# Patient Record
Sex: FEMALE
[De-identification: names, ages, dates, MRNs, and addresses within clinical notes are randomized per-mention and may not be internally consistent; named-entity substitution may affect disease eponyms.]

---

## 2022-07-18 ENCOUNTER — HOSPITAL ENCOUNTER (INPATIENT)
Dept: HOSPITAL 5 - TRG | Age: 27
LOS: 1 days | Discharge: HOME | End: 2022-07-19
Attending: STUDENT IN AN ORGANIZED HEALTH CARE EDUCATION/TRAINING PROGRAM | Admitting: STUDENT IN AN ORGANIZED HEALTH CARE EDUCATION/TRAINING PROGRAM
Payer: COMMERCIAL

## 2022-07-18 DIAGNOSIS — Z20.822: ICD-10-CM

## 2022-07-18 DIAGNOSIS — K50.90: ICD-10-CM

## 2022-07-18 DIAGNOSIS — Z3A.40: ICD-10-CM

## 2022-07-18 DIAGNOSIS — Z23: ICD-10-CM

## 2022-07-18 LAB
HCT VFR BLD CALC: 39.7 % (ref 30.3–42.9)
HGB BLD-MCNC: 14.1 GM/DL (ref 10.1–14.3)
MCHC RBC AUTO-ENTMCNC: 35 % (ref 30–34)
MCV RBC AUTO: 85 FL (ref 79–97)
PLATELET # BLD: 186 K/MM3 (ref 140–440)
RBC # BLD AUTO: 4.65 M/MM3 (ref 3.65–5.03)

## 2022-07-18 PROCEDURE — U0003 INFECTIOUS AGENT DETECTION BY NUCLEIC ACID (DNA OR RNA); SEVERE ACUTE RESPIRATORY SYNDROME CORONAVIRUS 2 (SARS-COV-2) (CORONAVIRUS DISEASE [COVID-19]), AMPLIFIED PROBE TECHNIQUE, MAKING USE OF HIGH THROUGHPUT TECHNOLOGIES AS DESCRIBED BY CMS-2020-01-R: HCPCS

## 2022-07-18 PROCEDURE — 86901 BLOOD TYPING SEROLOGIC RH(D): CPT

## 2022-07-18 PROCEDURE — 3E0R3BZ INTRODUCTION OF ANESTHETIC AGENT INTO SPINAL CANAL, PERCUTANEOUS APPROACH: ICD-10-PCS | Performed by: STUDENT IN AN ORGANIZED HEALTH CARE EDUCATION/TRAINING PROGRAM

## 2022-07-18 PROCEDURE — 86900 BLOOD TYPING SEROLOGIC ABO: CPT

## 2022-07-18 PROCEDURE — 85027 COMPLETE CBC AUTOMATED: CPT

## 2022-07-18 PROCEDURE — 86592 SYPHILIS TEST NON-TREP QUAL: CPT

## 2022-07-18 PROCEDURE — 85018 HEMOGLOBIN: CPT

## 2022-07-18 PROCEDURE — 59025 FETAL NON-STRESS TEST: CPT

## 2022-07-18 PROCEDURE — 85014 HEMATOCRIT: CPT

## 2022-07-18 PROCEDURE — 00HU33Z INSERTION OF INFUSION DEVICE INTO SPINAL CANAL, PERCUTANEOUS APPROACH: ICD-10-PCS | Performed by: STUDENT IN AN ORGANIZED HEALTH CARE EDUCATION/TRAINING PROGRAM

## 2022-07-18 PROCEDURE — 36415 COLL VENOUS BLD VENIPUNCTURE: CPT

## 2022-07-18 PROCEDURE — 86850 RBC ANTIBODY SCREEN: CPT

## 2022-07-18 RX ADMIN — FENTANYL CITRATE SCH MLS/HR: 50 INJECTION, SOLUTION INTRAMUSCULAR; INTRAVENOUS at 02:33

## 2022-07-18 RX ADMIN — DOCUSATE SODIUM SCH: 100 CAPSULE, LIQUID FILLED ORAL at 22:00

## 2022-07-18 RX ADMIN — IBUPROFEN SCH MG: 800 TABLET, FILM COATED ORAL at 18:15

## 2022-07-18 RX ADMIN — FENTANYL CITRATE SCH MLS/HR: 50 INJECTION, SOLUTION INTRAMUSCULAR; INTRAVENOUS at 10:44

## 2022-07-18 RX ADMIN — IBUPROFEN SCH: 800 TABLET, FILM COATED ORAL at 20:20

## 2022-07-18 NOTE — PROGRESS NOTE
Labor Epidural





- Labor Epidural


Start Time: 01:53


Stop Time: 01:57


Performed by:: CHILO JOSEPH


Procedure: 





Patient is requesting epidural for labor and pain.  H&P, labs were reviewed.   

Patient IDed, all questions and concerns were answered, and consent was signed. 

Timeout was performed at bedside.  Patient in sitting position.  Sterile prep 

and drape was performed.  3ml of 1% lidocaine skin wheal at L[3]- L [4].  17-

gauge Tuohy epidural needle was advanced to loss of resistance with air 

technique cm.  Negative CSF negative blood.  Epidural catheter advanced to [15] 

centimeters.  [negative] Aspiration [negative] test dose.  Sterile dressing 

applied.  Patient tolerated procedure.

## 2022-07-18 NOTE — ANESTHESIA CONSULTATION
Anesthesia Consult and Med Hx


Date of service: 07/18/22





- Airway


Anesthetic Teeth Evaluation: Good


ROM Head & Neck: Adequate


Mental/Hyoid Distance: Adequate


Mallampati Class: Class II


Intubation Access Assessment: Good





- Pulmonary Exam


CTA: Yes





- Cardiac Exam


Cardiac Exam: RRR





- Pre-Operative Health Status


ASA Pre-Surgery Classification: ASA2


Proposed Anesthetic Plan: Epidural





- Pulmonary


Hx Asthma: No


COPD: No


Hx Pneumonia: No





- Cardiovascular System


Hx Hypertension: No





- Central Nervous System


Hx Seizures: No


Hx Psychiatric Problems: No





- Endocrine


Hx Renal Disease: No


Hx End Stage Renal Disease: No


Hx Hypothyroidism: No


Hx Hyperthyroidism: No





- Hematic


Hx Anemia: No


Hx Sickle Cell Disease: No





- Other Systems


Hx Alcohol Use: No

## 2022-07-18 NOTE — HISTORY AND PHYSICAL REPORT
History of Present Illness


Date of examination: 22


Date of admission: 


2022


Chief complaint: 





contractions


History of present illness: 





Patient is a  at 40w0d presenting with contractions. Notes they began at 

4 AM yesterday. Currently every minute. Presented to triage yesterday with 

latent labor, but discharged home as she did not want to be placed on pitocin. 

+FM. Notes bloody show before arrival. Denies leakage of fluid. 





Past History


Past Medical History: other (Crohn's Disease)


Past Surgical History: no surgical history


Family/Genetic History: cancer


Social history: no significant social history





- Obstetrical History


Expected Date of Delivery: 22


Actual Gestation: 40 Week(s) 0 Day(s) 


: 3


Para: 2


Hx # Term Pregnancies: 2


Number of  Pregnancies: 0


Spontaneous Abortions: 0


Induced : 0


Number of Living Children: 2





Medications and Allergies


                                    Allergies











Allergy/AdvReac Type Severity Reaction Status Date / Time


 


No Known Allergies Allergy   Verified 16 01:19











                                Home Medications











 Medication  Instructions  Recorded  Confirmed  Last Taken  Type


 


Ibuprofen [Motrin 800 MG tab] 800 mg PO Q8HR PRN #30 tablet 16  Unknown Rx


 


Lidocain2.5%/Prilocai2.5% [Emla] 5 gm TP ONCE PRN #1 tube 16  Unknown Rx


 


Pnv,Calcium 72/Iron/Folic Acid 1 tab PO DAILY 16 Unknown History





[Pnv Prenatal Plus Multivit Tab]     














Review of Systems


Genitourinary: vaginal bleeding, contractions





- Vital Signs


Vital signs: 


                                   Vital Signs











Temp


 


 97.9 F 


 


 22 00:23








                                        











Temp Pulse Resp BP Pulse Ox


 


 97.9 F   90      124/84   99 


 


 22 00:23  22 00:48     22 00:30  22 00:48














- Physical Exam


Abdomen: Positive: normal appearance, soft, normal bowel sounds.  Negative: 

distention, tenderness





- Obstetrical


Cervical Dilatation: 6.5


Cervical Effacement Percentage: 90


Fetal station: -2


Uterine Contraction Frequency (min): q 1 min


Uterine Contraction Pattern: Regular





Results


Result Diagrams: 


                                 22 00:46





All other labs normal.








Assessment and Plan





Patient in active labor, admit to L&D


GBS negative


Desires epidural 


Type and screen, CBCB, CoVID testing


Anticipate  





- Patient Problems


(1) Active labor at term


Current Visit: Yes   Status: Acute   





(2) 40 weeks gestation of pregnancy


Current Visit: No   Status: Acute

## 2022-07-18 NOTE — PROCEDURE NOTE
OB Delivery Note





- Delivery


Date of Delivery: 22


Estimated blood loss: 200cc





- Vaginal


Delivery presentation: vertex


Delivery position: OA


Intrapartum events: none


Delivery induction: none


Delivery augmentation: rupture of membranes, pitocin


Delivery monitor: external FHT, external uterine


Route of delivery: 


Delivery placenta: spontaneous


Delivery cord: 3 umbilical vessels


Episiotomy: none


Delivery laceration: none


Anesthesia: epidural


Delivery comments: 


 of viable female infant. 1500ml of amniotic fluid noted after delivery of 

infant. Infant to mother's abdomen for skin to skin. Cord cut and clamped after 

cessation of pulse. Infant handed to EDGAR RN for evaluation. Spontaneous delivery

of placenta, intact, complete, 3 vessels noted. Perineum and vaginal inspected, 

no lacerations noted. Blood loss: 200ml. Apgars 8,9. Infant weight 9-5. 

Instruments and sponges counted with RN X2 and correct X2. Infant and mother 

left in stable condition in the care of the RN. 








- Infant


  ** A


Apgar at 1 minute: 8


Apgar at 5 minutes: 9


Infant Gender: Female (9-5)

## 2022-07-18 NOTE — EVENT NOTE
Date: 22


Pt feeling more pressure. Cervical exam 10/100/0. Category 1 EFM tracing. RN 

taking care of patient aware. Anticipate .

## 2022-07-18 NOTE — PROGRESS NOTE
Assessment and Plan


A: 26 y.o. @ 40 wks, active labor. 








- Patient Problems


(1) Active labor at term


Current Visit: Yes   Status: Acute   


Plan to address problem: 


Will start Pitocin per protocol. 


 Continue to monitor fetal status through EFM. 


 Anticipate . 








Subjective





- Subjective


Date of service: 22


Principal diagnosis: IUP @ 40 wks, active labor


Patient reports: fetal movement normal





Objective





- Vital Signs


Vital Signs: 


                               Vital Signs - 12hr











  22





  00:23 00:28 00:30


 


Temperature 97.9 F  


 


Pulse Rate   98 H


 


Blood Pressure   124/84


 


O2 Sat by Pulse  89 





Oximetry   


 


O2 Sat by Pulse   





Oximetry [   





Throughout]   














  22





  00:33 00:38 00:43


 


Temperature   


 


Pulse Rate 95 H 82 94 H


 


Blood Pressure   


 


O2 Sat by Pulse 99 99 100





Oximetry   


 


O2 Sat by Pulse   





Oximetry [   





Throughout]   














  22





  00:48 01:00 01:34


 


Temperature   


 


Pulse Rate 90  81


 


Blood Pressure   


 


O2 Sat by Pulse 99  98





Oximetry   


 


O2 Sat by Pulse  99 





Oximetry [   





Throughout]   














  22





  01:35 01:39 01:40


 


Temperature   


 


Pulse Rate 66 81 62


 


Blood Pressure   


 


O2 Sat by Pulse 94 98 86





Oximetry   


 


O2 Sat by Pulse   





Oximetry [   





Throughout]   














  22





  01:44 01:48 01:49


 


Temperature   


 


Pulse Rate 94 H 80 107 H


 


Blood Pressure   


 


O2 Sat by Pulse 99 89 98





Oximetry   


 


O2 Sat by Pulse   





Oximetry [   





Throughout]   














  22





  01:54 01:59 02:01


 


Temperature   


 


Pulse Rate 93 H 99 H 76


 


Blood Pressure   134/78


 


O2 Sat by Pulse 99 98 





Oximetry   


 


O2 Sat by Pulse   





Oximetry [   





Throughout]   














  22





  02:04 02:09 02:12


 


Temperature   


 


Pulse Rate 82 87 92 H


 


Blood Pressure   112/59


 


O2 Sat by Pulse 99 98 





Oximetry   


 


O2 Sat by Pulse   





Oximetry [   





Throughout]   














  22





  02:14 02:19 02:22


 


Temperature   


 


Pulse Rate 101 H 82 86


 


Blood Pressure   113/68


 


O2 Sat by Pulse 98 97 





Oximetry   


 


O2 Sat by Pulse   





Oximetry [   





Throughout]   














  22





  02:24 02:29 02:31


 


Temperature   


 


Pulse Rate 95 H 96 H 93 H


 


Blood Pressure   107/66


 


O2 Sat by Pulse 98 98 





Oximetry   


 


O2 Sat by Pulse   





Oximetry [   





Throughout]   














  22





  02:34 02:39 02:44


 


Temperature   


 


Pulse Rate 80 90 94 H


 


Blood Pressure   


 


O2 Sat by Pulse 97 98 98





Oximetry   


 


O2 Sat by Pulse   





Oximetry [   





Throughout]   














  22





  02:49 02:52 02:54


 


Temperature   


 


Pulse Rate 85 81 82


 


Blood Pressure  103/65 


 


O2 Sat by Pulse 98  97





Oximetry   


 


O2 Sat by Pulse   





Oximetry [   





Throughout]   














  22





  02:59 03:04 03:09


 


Temperature   


 


Pulse Rate 89 90 88


 


Blood Pressure   


 


O2 Sat by Pulse 98 98 98





Oximetry   


 


O2 Sat by Pulse   





Oximetry [   





Throughout]   














  22





  03:13 03:14 03:19


 


Temperature   


 


Pulse Rate 98 H 94 H 85


 


Blood Pressure 103/66  


 


O2 Sat by Pulse  98 98





Oximetry   


 


O2 Sat by Pulse   





Oximetry [   





Throughout]   














  22





  03:24 03:29 03:33


 


Temperature   


 


Pulse Rate 82 83 93 H


 


Blood Pressure   110/59


 


O2 Sat by Pulse 98 98 





Oximetry   


 


O2 Sat by Pulse   





Oximetry [   





Throughout]   














  22





  03:34 03:39 03:44


 


Temperature   


 


Pulse Rate 102 H 90 88


 


Blood Pressure   


 


O2 Sat by Pulse 98 98 98





Oximetry   


 


O2 Sat by Pulse   





Oximetry [   





Throughout]   














  22





  03:49 03:53 03:54


 


Temperature   


 


Pulse Rate 86 80 81


 


Blood Pressure  100/57 


 


O2 Sat by Pulse 97  98





Oximetry   


 


O2 Sat by Pulse   





Oximetry [   





Throughout]   














  22





  03:59 04:04 04:09


 


Temperature   


 


Pulse Rate 74 75 73


 


Blood Pressure   


 


O2 Sat by Pulse 98 97 98





Oximetry   


 


O2 Sat by Pulse   





Oximetry [   





Throughout]   














  22





  04:14 04:19 04:24


 


Temperature   


 


Pulse Rate 93 H 89 86


 


Blood Pressure 102/54  


 


O2 Sat by Pulse 98 97 98





Oximetry   


 


O2 Sat by Pulse   





Oximetry [   





Throughout]   














  22





  04:29 04:34 04:39


 


Temperature   


 


Pulse Rate 82 90 84


 


Blood Pressure  100/63 


 


O2 Sat by Pulse 97 97 97





Oximetry   


 


O2 Sat by Pulse   





Oximetry [   





Throughout]   














  22





  04:44 04:49 04:54


 


Temperature   


 


Pulse Rate 79 85 87


 


Blood Pressure   


 


O2 Sat by Pulse 98 97 97





Oximetry   


 


O2 Sat by Pulse   





Oximetry [   





Throughout]   














  22





  04:59 05:04 05:09


 


Temperature   


 


Pulse Rate 76 82 79


 


Blood Pressure   


 


O2 Sat by Pulse 98 98 99





Oximetry   


 


O2 Sat by Pulse   





Oximetry [   





Throughout]   














  22





  05:13 05:14 05:19


 


Temperature   


 


Pulse Rate 88 76 88


 


Blood Pressure 109/59  


 


O2 Sat by Pulse  99 99





Oximetry   


 


O2 Sat by Pulse   





Oximetry [   





Throughout]   














  22





  05:24 05:29 05:34


 


Temperature   


 


Pulse Rate 84 77 66


 


Blood Pressure   


 


O2 Sat by Pulse 99 99 99





Oximetry   


 


O2 Sat by Pulse   





Oximetry [   





Throughout]   














  22





  05:35 05:39 05:44


 


Temperature   


 


Pulse Rate 75 77 83


 


Blood Pressure 112/65  


 


O2 Sat by Pulse  99 98





Oximetry   


 


O2 Sat by Pulse   





Oximetry [   





Throughout]   














  22





  05:49 05:54 05:59


 


Temperature   


 


Pulse Rate 78 73 78


 


Blood Pressure   


 


O2 Sat by Pulse 99 100 100





Oximetry   


 


O2 Sat by Pulse   





Oximetry [   





Throughout]   














  22





  06:04 06:09 06:13


 


Temperature   


 


Pulse Rate 84 89 90


 


Blood Pressure   119/71


 


O2 Sat by Pulse 100 95 





Oximetry   


 


O2 Sat by Pulse   





Oximetry [   





Throughout]   














  22





  06:14 06:19 06:24


 


Temperature   


 


Pulse Rate 77 80 92 H


 


Blood Pressure   


 


O2 Sat by Pulse 100 100 100





Oximetry   


 


O2 Sat by Pulse   





Oximetry [   





Throughout]   














  22





  06:29 06:34 06:39


 


Temperature  97.9 F 


 


Pulse Rate 97 H 78 79


 


Blood Pressure   


 


O2 Sat by Pulse 100 100 100





Oximetry   


 


O2 Sat by Pulse   





Oximetry [   





Throughout]   














  22





  06:44 06:49 06:53


 


Temperature   


 


Pulse Rate 91 H 83 88


 


Blood Pressure   116/73


 


O2 Sat by Pulse 98 100 





Oximetry   


 


O2 Sat by Pulse   





Oximetry [   





Throughout]   














  22





  06:54 06:59 07:04


 


Temperature   


 


Pulse Rate 74 91 H 83


 


Blood Pressure   


 


O2 Sat by Pulse 98 90 100





Oximetry   


 


O2 Sat by Pulse   





Oximetry [   





Throughout]   














  22





  07:09 07:14 07:19


 


Temperature   


 


Pulse Rate 81 89 87


 


Blood Pressure  103/68 


 


O2 Sat by Pulse 100 99 100





Oximetry   


 


O2 Sat by Pulse   





Oximetry [   





Throughout]   














  22





  07:24 07:29 07:34


 


Temperature   


 


Pulse Rate 84 77 84


 


Blood Pressure   123/79


 


O2 Sat by Pulse 100 99 98





Oximetry   


 


O2 Sat by Pulse   





Oximetry [   





Throughout]   














- Exam


Narrative Exam: 


AROM clear fluid. 





Cardiovascular: Regular rate


Lungs: Normal air movement


Abdomen: Present: normal appearance, soft


Vulva: both: normal


Uterus: Present: normal


FHR: category 1


Uterine Contraction Monitor Mode: External


Cervical Dilatation: 8


Cervical Effacement Percentage: 90


Fetal station: -1


Uterine Contraction Pattern: Regular


Uterine Tone Measurement Phase: Resting


Uterine Contraction Intensity: Moderate


Extremities: normal





- Labs


Labs: 


                                  Abnormal Labs











  22





  00:46


 


WBC  15.0 H


 


MCHC  35 H








                         Laboratory Results - last 24 hr











  22





  00:46 00:46 00:46


 


WBC  15.0 H  


 


RBC  4.65  


 


Hgb  14.1  


 


Hct  39.7  


 


MCV  85  


 


MCH  30  


 


MCHC  35 H  


 


RDW  14.9  


 


Plt Count  186  


 


Syphilis IgG/IgM Ab    Nonreactive


 


Blood Type   O POSITIVE 


 


Antibody Screen   Negative

## 2022-07-19 VITALS — DIASTOLIC BLOOD PRESSURE: 70 MMHG | SYSTOLIC BLOOD PRESSURE: 117 MMHG

## 2022-07-19 LAB
HCT VFR BLD CALC: 33.4 % (ref 30.3–42.9)
HGB BLD-MCNC: 11.5 GM/DL (ref 10.1–14.3)

## 2022-07-19 PROCEDURE — 3E0234Z INTRODUCTION OF SERUM, TOXOID AND VACCINE INTO MUSCLE, PERCUTANEOUS APPROACH: ICD-10-PCS | Performed by: STUDENT IN AN ORGANIZED HEALTH CARE EDUCATION/TRAINING PROGRAM

## 2022-07-19 RX ADMIN — DOCUSATE SODIUM SCH MG: 100 CAPSULE, LIQUID FILLED ORAL at 10:12

## 2022-07-19 RX ADMIN — IBUPROFEN SCH: 800 TABLET, FILM COATED ORAL at 02:20

## 2022-07-19 RX ADMIN — IBUPROFEN SCH MG: 800 TABLET, FILM COATED ORAL at 10:12

## 2022-07-19 NOTE — DISCHARGE SUMMARY
Providers





- Providers


Date of Admission: 


22 00:49





Date of discharge: 22 (desires d/c home)


Attending physician: 


DONELL PEACE MD





                                        





22 14:20


Consult to Lactation Consultant [CONS] Routine 


   Reason For Exam: assistance with breastfeeding, SNS











Primary care physician: 


DONELL PEACE MD








Hospitalization


Reason for admission: Delivery 


Condition: Good


Pertinent studies: 


 post delivery H&H 11.5/33.4





Procedures: 


 





Hospital course: 


 uncomplicated  and postpartum course





Disposition: 01 HOME / SELF CARE / HOMELESS


Final Discharge Diagnosis (Prints w/discharge instructions): vaginal birth


Time spent for discharge: 15





- Discharge Diagnoses


(1) Vaginal delivery


Status: Acute   Comment: normal delivery done   





Core Measure Documentation





- Palliative Care


Palliative Care/ Comfort Measures: Not Applicable





- Core Measures


Any of the following diagnoses?: none





Exam





- Constitutional


Vitals: 


                                        











Temp Pulse Resp BP Pulse Ox


 


 97.8 F   88   18   115/71   98 


 


 22 00:22  22 00:22  22 02:20  22 00:22  22 00:22











General appearance: Present: no acute distress, well-nourished





- EENT


Eyes: Present: PERRL


ENT: hearing intact, clear oral mucosa





- Neck


Neck: Present: supple, normal ROM





- Respiratory


Respiratory effort: normal


Respiratory: bilateral: CTA





- Cardiovascular


Rhythm: regular


Heart Sounds: Absent: rub, click





- Extremities


Extremities: No edema


Peripheral Pulses: within normal limits





- Abdominal


General gastrointestinal: Present: soft, non-tender, non-distended, normal bowel

 sounds


Female genitourinary: Present: normal





- Integumentary


Integumentary: Present: clear, warm, dry





- Musculoskeletal


Musculoskeletal: gait normal, strength equal bilaterally





- Psychiatric


Psychiatric: appropriate mood/affect, intact judgment & insight





- Neurologic


Neurologic: CNII-XII intact, moves all extremities





- Additional findings


Additional findings: 


 Lochia scant, fundus firm








Plan


Activity: no restrictions


Diet: regular


Follow up with: 


DONELL PEACE MD [Primary Care Provider] - 6 Weeks (Congratulations!  

Please call [phone] to schedule your postpartum visit in 6 weeks. Call for 

any questions or concerns. )

## 2022-07-19 NOTE — POST ANESTHESIA EVALUATION
- Post Anesthesia Evaluation


Patient Participated: No


Airway Patent: Yes


Stable Respiratory Function: Yes


Nausea/Vomiting: No


Temp > 96.8F: Yes


Pain Manageable: Yes


Adequeate Hydration: Yes


Anesthesia Complications: No


Block Receding Appropriately: Yes


Patient on Ventilator: No